# Patient Record
(demographics unavailable — no encounter records)

---

## 2025-05-14 NOTE — PHYSICAL EXAM
[TextEntry] : PHYSICAL EXAM  General: The patient was alert, oriented and in no distress. Voice was clear.  Face: The patient had no facial asymmetry or mass. The skin was unremarkable.  Ears: Hearing normal to conversational voice External ears were normal without deformity. Ear canals were clear. No cerumen or inflammation Tympanic membranes were intact and normal. No perforation or effusion. mobile  Nose:  The external nose had no significant deformity.. On anterior rhinoscopy, the nasal mucosa was clear.  The anterior septum was grossly midline. There were no visualized polyps, purulence  or masses.   Oral cavity: Oral mucosa- normal. Oral and base of tongue- clear and without mass. Gingival and buccal mucosa- moist and without lesions. Palate- the palate moved well. There was no cleft palate. There appeared to be good salivary flow.   Oral cavity/oropharynx- no pus, erythema or mass  Neck:  The neck was symmetrical. The parotid and submandibular glands were normal without masses. The trachea was midline and there was no unusual crepitus. Thyroid was smooth and nontender and no masses were palpated. No masses Full range of motion  Lymphatics: Cervical adenopathy- none.     PROCEDURE:   NASAL ENDOSCOPY  Surgeon: Dr. Maldonado Indication: Headaches, chronic rhinitis, assess for sinusitis, inadequate exam on anterior rhinoscopy Anesthetic: Topical lidocaine   Procedure: The patient was placed in a sitting position.  Following application of the topical anesthetic, exam was performed with a flexible telescope.  The scope was passed along the right nasal floor to the nasopharynx.  It was then passed into the region of the middle meatus, middle turbinate, and sphenoethmoid region.  An identical procedure was performed on the left side.  The following findings were noted:   Nasal mucosa: Mild edema-pale and boggy Septum: Wavy  Right nasal cavity- there is moderate edema with cloudy mucus.  No purulent drainage..      Inferior turbinate: Hypertrophic      Middle turbinate: normal      Superior turbinate: normal      Inferior meatus: no pus, polyps or congestion      Middle meatus: Mild edema.  No polyps       Superior meatus: Edema      Sphenoethmoidal recess: Edema  Left nasal cavity      Inferior turbinate: Hypertrophic      Middle turbinate: normal      Superior turbinate: normal      Inferior meatus: no pus, polyps or congestion      Middle meatus: no pus, polyps, or congestion      Superior meatus:  no pus, polyps, or congestion      Sphenoethmoidal recess: no pus, polyps or congestion  Nasopharynx: no masses, choanae patent.  Moderate adenoid hypertrophy.

## 2025-05-14 NOTE — HISTORY OF PRESENT ILLNESS
[de-identified] : WESLEY BHATTI is a 12 year old patient Here for at least a 2-week history of severe headaches.  His parents say that they start over the bridge of his nose and radiate to the back of his head.  He also has nausea with vomiting.  There is occasionally blood-tinged mucus when he blows his nose.  He does have nasal congestion and obstruction.  He had fevers of 102 initially but those stopped at least 1 week ago.  They have been to the ER twice.  They went 2 weeks ago and 1.5 weeks ago.  They did do some cardiac testing.  He has no stiff neck.  He does have some sensitivity to loud noise.  He has not seen neurologist or been diagnosed with migraines.  He suffers from allergies.   He does not a history of chronic sinus disease. He is on naproxen, famotidine, an antihistamine, and Flonase